# Patient Record
(demographics unavailable — no encounter records)

---

## 2025-04-18 NOTE — PHYSICAL EXAM
[Alert] : alert [Acute Distress] : no acute distress [Normocephalic] : normocephalic [Flat Open Anterior [place]] : flat open anterior fontanelle [PERRL] : PERRL [Red Reflex Bilateral] : red reflex bilateral [Normally Placed Ears] : normally placed ears [Auricles Well Formed] : auricles well formed [Clear Tympanic membranes] : clear tympanic membranes [Light reflex present] : light reflex present [Bony landmarks visible] : bony landmarks visible [Discharge] : no discharge [Nares Patent] : nares patent [Palate Intact] : palate intact [Uvula Midline] : uvula midline [Supple, full passive range of motion] : supple, full passive range of motion [Palpable Masses] : no palpable masses [Symmetric Chest Rise] : symmetric chest rise [Clear to Auscultation Bilaterally] : clear to auscultation bilaterally [Regular Rate and Rhythm] : regular rate and rhythm [S1, S2 present] : S1, S2 present [Murmurs] : no murmurs [+2 Femoral Pulses] : +2 femoral pulses [Soft] : soft [Tender] : nontender [Distended] : not distended [Bowel Sounds] : bowel sounds present [Hepatomegaly] : no hepatomegaly [Splenomegaly] : no splenomegaly [Normal external genitailia] : normal external genitalia [Central Urethral Opening] : central urethral opening [Testicles Descended Bilaterally] : testicles descended bilaterally [Normally Placed] : normally placed [No Abnormal Lymph Nodes Palpated] : no abnormal lymph nodes palpated [Zheng-Ortolani] : negative Zheng-Ortolani [Allis Sign] : negative Allis sign [Symmetric Flexed Extremities] : symmetric flexed extremities [Spinal Dimple] : no spinal dimple [Tuft of Hair] : no tuft of hair [Startle Reflex] : startle reflex present [Suck Reflex] : suck reflex present [Rooting] : rooting reflex present [Palmar Grasp] : palmar grasp reflex present [Plantar Grasp] : plantar grasp reflex present [Symmetric [place]] : symmetric [place] [Rash and/or lesion present] : no rash/lesion

## 2025-04-18 NOTE — PHYSICAL EXAM
[Alert] : alert [Acute Distress] : no acute distress [Normocephalic] : normocephalic [Flat Open Anterior [place]] : flat open anterior fontanelle [PERRL] : PERRL [Red Reflex Bilateral] : red reflex bilateral [Normally Placed Ears] : normally placed ears [Auricles Well Formed] : auricles well formed [Clear Tympanic membranes] : clear tympanic membranes [Light reflex present] : light reflex present [Bony landmarks visible] : bony landmarks visible [Discharge] : no discharge [Nares Patent] : nares patent [Palate Intact] : palate intact [Uvula Midline] : uvula midline [Supple, full passive range of motion] : supple, full passive range of motion [Palpable Masses] : no palpable masses [Symmetric Chest Rise] : symmetric chest rise [Clear to Auscultation Bilaterally] : clear to auscultation bilaterally [Regular Rate and Rhythm] : regular rate and rhythm [S1, S2 present] : S1, S2 present [Murmurs] : no murmurs [+2 Femoral Pulses] : +2 femoral pulses [Soft] : soft [Tender] : nontender [Distended] : not distended [Bowel Sounds] : bowel sounds present [Hepatomegaly] : no hepatomegaly [Splenomegaly] : no splenomegaly [Normal external genitailia] : normal external genitalia [Central Urethral Opening] : central urethral opening [Testicles Descended Bilaterally] : testicles descended bilaterally [Normally Placed] : normally placed [No Abnormal Lymph Nodes Palpated] : no abnormal lymph nodes palpated [Zheng-Ortolani] : negative Zheng-Ortolani [Allis Sign] : negative Allis sign [Symmetric Flexed Extremities] : symmetric flexed extremities [Spinal Dimple] : no spinal dimple [Tuft of Hair] : no tuft of hair [Startle Reflex] : startle reflex present [Suck Reflex] : suck reflex present [Rooting] : rooting reflex present [Palmar Grasp] : palmar grasp reflex present [Plantar Grasp] : plantar grasp reflex present [Symmetric Leonard] : symmetric [place] [Rash and/or lesion present] : no rash/lesion

## 2025-04-18 NOTE — DISCUSSION/SUMMARY
[Normal Growth] : growth [Normal Development] : development  [No Elimination Concerns] : elimination [Continue Regimen] : feeding [No Skin Concerns] : skin [Normal Sleep Pattern] : sleep [None] : no medical problems [Anticipatory Guidance Given] : Anticipatory guidance addressed as per the history of present illness section [Age Approp Vaccines] : Age appropriate vaccines administered [No Medications] : ~He/She~ is not on any medications [Parent/Guardian] : Parent/Guardian [FreeTextEntry1] : Feedings on demand, with a back up plan for scheduled feedings every 2-3 hours. Once weight gain is well established, it is generally then time to forgo the back up plan scheduled feedings. Clustered feedings, amount per feedings, and spacing of feedings will change frequently; follow the infant's lead.Anticipate 8-12 feedings per day.  Breast feeding benefits reviewed, as well as basic best practices.  Prenatal risk factors for hip dysplasia reviewed. Hospital records reviewed if available. Advance as directed  Vitamin D relevance and importance reviewed Follow up with any directions from the hospital or medical team including any prenatal discussions on matters that may require follow up. Some examples may include sonogram findings related to the kidneys, brain, or heart. [email or web address] as a resource over generic searches Cord and skin care Temperature definitions in infants, measuring temperature, presence or absence of concerning symptoms for temperature context, and appropriate timing of access to care reviewed.   2 month relevant matters revirewd as well.

## 2025-04-18 NOTE — DISCUSSION/SUMMARY
[Normal Growth] : growth [Normal Development] : development  [No Elimination Concerns] : elimination [Continue Regimen] : feeding [No Skin Concerns] : skin [Normal Sleep Pattern] : sleep [None] : no medical problems [Anticipatory Guidance Given] : Anticipatory guidance addressed as per the history of present illness section [Age Approp Vaccines] : Age appropriate vaccines administered [No Medications] : ~He/She~ is not on any medications [Parent/Guardian] : Parent/Guardian [FreeTextEntry1] : Feedings on demand, with a back up plan for scheduled feedings every 2-3 hours. Once weight gain is well established, it is generally then time to forgo the back up plan scheduled feedings. Clustered feedings, amount per feedings, and spacing of feedings will change frequently; follow the infant's lead.Anticipate 8-12 feedings per day.  Breast feeding benefits reviewed, as well as basic best practices.  Prenatal risk factors for hip dysplasia reviewed. Hospital records reviewed if available. Advance as directed  Vitamin D relevance and importance reviewed Follow up with any directions from the hospital or medical team including any prenatal discussions on matters that may require follow up. Some examples may include sonogram findings related to the kidneys, brain, or heart. [email or web address] as a resource over generic searches Cord and skin care Temperature definitions in infants, measuring temperature, presence or absence of concerning symptoms for temperature context, and appropriate timing of access to care reviewed.   2 month relevant matters revirewd as well.  normal shape

## 2025-04-18 NOTE — HISTORY OF PRESENT ILLNESS
[Parents] : parents [FreeTextEntry1] : Reviewed NICU Reviewed prenatal problems requiring potential follow up for feedings, jaundice, kidney/brain/heart/other sonograms, and risk factors for hip dysplasia.   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

## 2025-04-21 NOTE — HISTORY OF PRESENT ILLNESS
[FreeTextEntry6] : RANJITH FERNANDEZ is a 2 month old male presenting for follow up from the ER.  He was taken to the ED on 4/20/25 after calling 911 for choking while drinking his bottle. The ambulance took him to [facility] where he was diagnosed with a BRUE/reflux. He was monitored and discharged home. He has been drinking well since then.  Mom stopped fortifying the EHM to 22 montrell/ounce yesterday. He also had these episodes while in the NICU.  He was discharged home last week.   No recent illness. Mom also states that she has a concern about possible seizure activity. She said that he stiffens his extremities for a few seconds and sometimes looks like he is staring at something.  She said she brought these concerns up while he was in the NICU and she was told it was normal for his development/prematurity and some reflux.

## 2025-04-21 NOTE — PHYSICAL EXAM
[No Acute Distress] : no acute distress [NL] : soft, nontender, nondistended, normal bowel sounds, no hepatosplenomegaly [No Abnormal Lymph Nodes Palpated] : no abnormal lymph nodes palpated [Moves All Extremities x 4] : moves all extremities x4 [Normotonic] : normotonic [Warm] : warm

## 2025-04-21 NOTE — HISTORY OF PRESENT ILLNESS
[FreeTextEntry6] : RANJITH FERNANDEZ is a 2 month old male presenting for follow up from the ER.  He was taken to the ED on 4/20/25 after calling 911 for choking while drinking his bottle. The ambulance took him to [facility] where he was diagnosed with a BRUE/reflux. He was monitored and discharged home. He has been drinking well since then.  Mom stopped fortifying the EHM to 22 montrell/ounce yesterday. He also had these episodes while in the NICU.  He was discharged home last week.   No recent illness. Mom also states that she has a concern about possible seizure activity. She said that he stiffens his extremities for a few seconds and sometimes looks like he is staring at something.  She said she brought these concerns up while he was in the NICU and she was told it was normal for his development/prematurity and some reflux.

## 2025-04-21 NOTE — DISCUSSION/SUMMARY
[FreeTextEntry1] : 2 mo here in follow up after BRUE/Choking event.  Hospital records were reviewed today.   Journey is well appearing today.  We discussed reflux which can cause similar symptoms that can mimic seizure activity in infants.  Given the occurrence this weekend and his history, Pepcid was started.   Referral to ENT for swallow study given choking episodes.  Referral to pediatric neurology for parental concern regarding seizure like activity; I asked mom to record (if safe) episodes that she feels appear to be seizure like.    To reduce reflux symptoms, follow reflux precautions. Keep the baby upright after eating for 20 to 30 minutes after a feeding.  Keep baby away from cigarette smoke. Avoid overfeeding baby. Call office or seek medical attention for bloody stools or diarrhea, reduced diaper count, bloody emesis, prolonged crying which cannot be explained, refusal to drink, drowsiness or projectile emesis.

## 2025-04-23 NOTE — HISTORY OF PRESENT ILLNESS
[de-identified] : Angel is a 2 month old ex- 30 week boy here for evaluation after choking episode.  Here with parents who provide history.   Born premature and in the NICU after birth. Intubated for a few days and required NGT previously.  Recently discharged 2 weeks ago.  Had a choking episode 25 requiring evaluation at [facility] ED after lips turned blue.  Mom reports intermittently with choking but never turned blue like this at home before.  Appeared to be breath holding.  Does have noisy nasal sound after he eats.  No increased work of breathing.   Does have reflux with feeds.  Started on Pepcid 2 days ago. Stopped fortifying feeds after choking episode.   Passed  hearing test.  No hearing concerns.  No other otolaryngology concerns.

## 2025-04-23 NOTE — REASON FOR VISIT
[Initial Evaluation] : an initial evaluation for [Parents] : parents [FreeTextEntry2] : choking episode

## 2025-04-23 NOTE — ASSESSMENT
[FreeTextEntry1] : We reviewed laryngsocopy findings which demonstrate mild arytenoid and post cricoid edema, which can be seen with reflux. We discussed obtaining a swallow study - this was ordered today. Follow up 1-2 months, earlier as indicated.

## 2025-04-23 NOTE — PROCEDURE
[FreeTextEntry1] : Flexible fiberoptic laryngoscopy [FreeTextEntry2] : Choking episode  [FreeTextEntry3] :  After verbal consent was obtained, the flexible fiberoptic laryngoscope was passed and the following was seen:   Nasal passage: clear without lesions or drainage, choanae patent bilaterally Nasopharynx: Adenoids with 10 % obstruction, normal closure of the velopharyngeal sphincter Supraglottis: Normal shape and mucosalization of the epiglottis, normal appearing aryepiglottic folds and arytenoids with mild edema Glottis: Normal mobility of right and left vocal cords. No vocal cord lesions. Subglottis: Subglottis as able to visualize appears patent. Hypopharynx: Mild post-cricoid edema without pooling of secretions.   The patient tolerated the procedure well without complications.

## 2025-04-23 NOTE — BIRTH HISTORY
[At ___ Weeks Gestation] : at [unfilled] weeks gestation [Passed] : passed [de-identified] : Intubated for a few days, had feeding tube

## 2025-04-23 NOTE — CONSULT LETTER
[Dear  ___] : Dear  [unfilled], [Courtesy Letter:] : I had the pleasure of seeing your patient, [unfilled], in my office today. [Please see my note below.] : Please see my note below. [Sincerely,] : Sincerely, [FreeTextEntry3] : Tayler Cadrenas M.D. [place] [address] Tel [phone] Fax [phone]

## 2025-04-24 NOTE — PHYSICAL EXAM
[Well-appearing] : well-appearing [Normocephalic] : normocephalic [Anterior fontanel- Open] : anterior fontanel- open [Anterior fontanel- Soft] : anterior fontanel- soft [Anterior fontanel- Flat] : anterior fontanel- flat [No abnormal neurocutaneous stigmata or skin lesions] : no abnormal neurocutaneous stigmata or skin lesions [Straight] : straight [No kyle or dimples] : no kyle or dimples [No deformities] : no deformities [Alert] : alert [Pupils reactive to light] : pupils reactive to light [Turns to light] : turns to light [Tracks face, light or objects with full extraocular movements] : tracks face, light or objects with full extraocular movements [No facial asymmetry or weakness] : no facial asymmetry or weakness [No nystagmus] : no nystagmus [Normal axial and appendicular muscle tone with symmetric limb movements] : normal axial and appendicular muscle tone with symmetric limb movements [Normal bulk] : normal bulk [Good  bilaterally] : good  bilaterally [No abnormal involuntary movements] : no abnormal involuntary movements [2+ biceps] : 2+ biceps [Knee jerks] : knee jerks [Ankle jerks] : ankle jerks [Leonard] : [place] [Grasp] : grasp [Responds to touch and tickle] : responds to touch and tickle [de-identified] : HC: 35.5cm

## 2025-04-24 NOTE — ASSESSMENT
[FreeTextEntry1] : 2mo ex 30wk M w/ GERD presenting for evaluation of seizure like activity. Patient currently clinically stable w/ nonfocal exam. Etiology of current episodes unclear, suspect may be related to known GERD, however cannot r/o seizures at this point. Will evaluate w/ REEG/VEEG and can RTC in 6 weeks.

## 2025-04-24 NOTE — PHYSICAL EXAM
[Well-appearing] : well-appearing [Normocephalic] : normocephalic [Anterior fontanel- Open] : anterior fontanel- open [Anterior fontanel- Soft] : anterior fontanel- soft [Anterior fontanel- Flat] : anterior fontanel- flat [No abnormal neurocutaneous stigmata or skin lesions] : no abnormal neurocutaneous stigmata or skin lesions [Straight] : straight [No kyle or dimples] : no kyle or dimples [No deformities] : no deformities [Alert] : alert [Pupils reactive to light] : pupils reactive to light [Turns to light] : turns to light [Tracks face, light or objects with full extraocular movements] : tracks face, light or objects with full extraocular movements [No facial asymmetry or weakness] : no facial asymmetry or weakness [No nystagmus] : no nystagmus [Normal axial and appendicular muscle tone with symmetric limb movements] : normal axial and appendicular muscle tone with symmetric limb movements [Normal bulk] : normal bulk [Good  bilaterally] : good  bilaterally [No abnormal involuntary movements] : no abnormal involuntary movements [2+ biceps] : 2+ biceps [Knee jerks] : knee jerks [Ankle jerks] : ankle jerks [Leonard] : [place] [Grasp] : grasp [Responds to touch and tickle] : responds to touch and tickle [de-identified] : HC: 35.5cm

## 2025-04-24 NOTE — CONSULT LETTER
[Dear  ___] : Dear  [unfilled], [Consult Letter:] : I had the pleasure of evaluating your patient, [unfilled]. [Please see my note below.] : Please see my note below. [Consult Closing:] : Thank you very much for allowing me to participate in the care of this patient.  If you have any questions, please do not hesitate to contact me. [Sincerely,] : Sincerely, [FreeTextEntry3] : Ada Dickson MD PGY- 4 Child Neurology [facility]  Gonzalo Lua MD, FAAN, FAASM Director, Division of Pediatric Neurology Co-Director, Sleep Program for Children (Neurology) [facility]  Professor of Pediatrics & Neurology [employer] at [facility] and [facility] [address] [place] [address] (T) [phone]  (F) [phone]

## 2025-04-24 NOTE — HISTORY OF PRESENT ILLNESS
[FreeTextEntry1] : 2mo ex 30.2wk M w/ GERD presenting for initial evaluation of seizure like activity.   Mom has noted episodes of whole body shaking w/ associated fine tremor of bilateral hands and eyes widening since birth, currently happening approx. 5x per day lasting up to 15-20 seconds. Mom states they happen at any time of day, including sleep, and mom has not noiced any particular provoking factors (though they do often happen during feeds). Mom also states that sometimes episodes will include patient looking to one side (usually left) w/ associated head turning to the same side. Of note patient is an ex 30wk M w/ 2 month NICU stay for respiratory distress, feeding/growing. Episodes occured during NICU period as well. Mom notes that they have become less frequent over time. 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

## 2025-04-24 NOTE — REASON FOR VISIT
[Initial Consultation] : an initial consultation for [Other: ____] : [unfilled] [Parents] : parents [Mother] : mother

## 2025-04-24 NOTE — CONSULT LETTER
[Dear  ___] : Dear  [unfilled], [Consult Letter:] : I had the pleasure of evaluating your patient, [unfilled]. [Please see my note below.] : Please see my note below. [Consult Closing:] : Thank you very much for allowing me to participate in the care of this patient.  If you have any questions, please do not hesitate to contact me. [Sincerely,] : Sincerely, [FreeTextEntry3] : Ada Dickson MD PGY- 4 Child Neurology [facility]  Gonzalo Lua MD, FAAN, FAASM Director, Division of Pediatric Neurology Co-Director, Sleep Program for Children (Neurology) [facility]  Professor of Pediatrics & Neurology [facility] [employer] at [facility] and [facility] [address] [place] [address] (T) [phone]  (F) [phone]

## 2025-05-06 NOTE — ASSESSMENT
[FreeTextEntry1] : ASSESSMENT:  Mental Status: Received asleep with quiet alert state with unswaddling and repositioning.  POSTURAL CONTROL & MOVEMENT PATTERN: Head Control: [X] Age Appropriate Trunk Control: [X] Age Appropriate Involuntary movement (chorea, dystonia, fasciculation, myoclonus, spasms, tremor):[X] Not observed  ORAL MOTOR ASSESSMENT: Patient presents with facial symmetry and a predominantly closed mouth posture at rest. Palate was WFL, oral mucosa was moist, edentulous oral cavity- age appropriate. Age appropriate secretion management noted. Age appropriate oral reflexes: Patient demonstrated the following upon clinician elicitation including rooting reflex, lingual protrusion to lower lip, transverse tongue with tongue touching corners of mouth, phasic bite, and non nutritive suck Gag reflex: At this time clinician did not attempt to elicit a gag. Abnormal reflexes: Not demonstrated  CLINICAL SWALLOWING/FEEDING ASSESSMENT: The patient was assessed feeding semi upright cradled for bottle feeding. Parents present throughout evaluation. SLP and mother served as feeders. Respiratory Status During Evaluation: Room Air Secretion Management: Age appropriate There was [X]no coughing, [X] no throat clearing, and [X]no wet/gurgly vocal quality prior to PO administration.  CLINICAL SWALLOWING EVALUATION TRIALS: Consistencies Administered/Modality of administration/utensil: [X] Expressed human breast milk (EHBM) via Parents Choice Slow Flow nipple and Dr. Orona's /Transition nipple  Endurance during meal/trials: [X] Good, quiet alert state throughout Patient required [X] minimal cues to complete testing/evaluation.  Oral phases: Patient's oral phases were marked by Orientation to feeding task: [X] Immediate mouth opening, initiation of latch and nutritive sucking Labial movements: [X] Functional latch to nipple. Moderate anterior loss via Parent's Choice nipple (home bottle system), Age appropriate oral containment noted with Dr. Orona's /Transition nipple  Lingual movements: Good lingual cupping, Good fluid expression with 1-2 sucks per swallow Coordination: Coordinated suck, swallow, breathe pattern with self pacing via Dr. Orona's Josephine/Transition nipple, Rapid uncoordinated sucking bursts via home bottle system  Endurance: Age appropriate Jaw movement: [X] Rhythmic Transport: Rapid AP transfer and oral transit with Parent's  Choice Slow flow (home bottle system) with Timely oral transit via Dr. Acharyas Josephine/Transition nipple and Adequate AP transfer Clearance: Age appropriate clearance from oral cavity  Pharyngeal Phase: Assessed via digital palpation Pharyngeal stage was marked by Initiation of the pharyngeal swallow: Prompt Hyolaryngeal elevation: Adequate Overt s/s of penetration/aspiration: Audible swallows noted via Parent's Choice Slow flow, remediated with use of Dr. Orona's Josephine/Transition nipple  Cardiopulmonary changes: Not demonstrated  PROGNOSIS: Prognosis is good for safe and adequate oral intake of the recommended consistencies as outlined below, based on the results of today's assessment and the medical history reported.  EDUCATION: Discussed results of evaluation with Mother who expressed understanding of evaluation and agreement with goals and treatment plan. Provided written preliminary recommendations.  IMPRESSIONS: Patient, a 2 month old former 30 week male, was seen for a Clinical Swallow Evaluation upon referral from his Otolaryngologist given history of coughing and choking with oral feedings. During oral motor examination, patient demonstrated age appropriate oral reflexes, and appropriate range of motion, strength, and coordination of lingual, labial, mandibular and buccal movements for oral feeding. Patient presents with immature feeding pattern with emerging coordination of  suck, swallow, breathe pattern. Patient benefitted from feeding strategies and supports to facilitate improved performance such as decreased flow rate and external pacing during initial sucking bursts. Audible swallows noted for trials via home bottle system due to coordination difficulties. With therapeutic supports in place, No overt signs/symptoms of penetration/aspiration or cardiopulmonary changes demonstrated for EHBM via Dr. Sahni /Transition nipple. Recommend to initiate oral feedings via Dr. Sahni Josephine/Transition nipple and anticipate continued improvements  therapeutic intervention and maturation.  Diet/Fluid Recommendations: Initiate oral diet of EHBM via .gisela   Feeding/Swallowing Recommendations: Provide external pacing as needed Upright position for 30 minutes after feeding Monitor for engagement/disengagement cues  ADDITIONAL RECOMMENDATIONS: 1. Initiate short-term outpatient dysphagia therapy addressing aforementioned deficits. Plan to defer further instrumental testing at this time given results of assessment this date.  2. It is recommended that patient participate in feeding therapy through Early Intervention addressing above mentioned deficits and age appropriate feeding skills.  3. Follow up with Gastroenterology as scheduled given history of reflux  4. Follow up with Otolaryngologist as scheduled  TREATMENT PLAN FOR SHORT-TERM OUTPATIENT DYSPHAGIA THERAPY AT THE [facility] & [facility] [place] TO INCLUDE:  LONG TERM GOALS: Patient will safely tolerate full oral feedings SHORT TERM GOALS: 1. Patient will safely tolerate oral diet of EHBM via Dr. Orona's Josephine/Transition nipple  without overt signs/ symptoms of penetration/aspiration in 100% of opportunities 2. Patient will demonstrate improved coordination of suck, swallow, breathe ratios to 1 suck, 1 swallow, 1 breath    3. Patient will demonstrate age appropriate endurance for oral feedings consuming full feeding in 30 minutes or less  4. Patient will complete oral stimulation program to support improved coordination of oromotor movements and respiration  5. Parent will demonstrate understanding of safe feeding guidelines to support safe oral feeding experiences   Aspiration precautions: [X] Monitor for signs and symptoms of aspiration and or laryngeal penetration, such as change of breathing pattern, cough, throat clearing, gurgly/wet voice, color change, fever, pneumonia, and upper respiratory infection. If noted: Discontinue oral intake, provide non-oral nutrition/hydration/meds, and contact physician immediately.  This referral process was reviewed with the parent. No further recommendations were made at this time. Please feel free to contact the Center at [phone], if any additional information is needed.   Cici Feliciano M.S., CCC-SLP [place] License# [id number]  References THANIA Clarke., & ADAM Walker (2001). Pediatric Swallowing and Feeding Assessment and Management (2nd ed.). JESSE Freeman, & AYLA Looney (2000). Pre-feeding skills: A comprehensive resource for mealtime development. Therapy [employer].  Aspiration precautions: [X] Monitor for signs and symptoms of aspiration and or laryngeal penetration, such as change of breathing pattern, cough, throat clearing, gurgly/wet voice, color change, fever, pneumonia, and upper respiratory infection. If noted: Discontinue oral intake, provide non-oral nutrition/hydration/meds, and contact physician immediately.  This referral process was reviewed with the parent. No further recommendations were made at this time. Please feel free to contact the Center at [phone], if any additional information is needed.  Cici Feliciano M.S., CCC-SLP [place] License# [id number]  References JAMIE Clarke, & ADAM Walker (2001). Pediatric Swallowing and Feeding Assessment and Management (2nd ed.). [employer].  JESSE Garay, & AYLA Looney (2000). Pre-feeding skills: A comprehensive resource for mealtime development. Therapy Sk[employer].

## 2025-05-06 NOTE — ASSESSMENT
[FreeTextEntry1] : ASSESSMENT:  Mental Status: Received asleep with quiet alert state with unswaddling and repositioning.  POSTURAL CONTROL & MOVEMENT PATTERN: Head Control: [X] Age Appropriate Trunk Control: [X] Age Appropriate Involuntary movement (chorea, dystonia, fasciculation, myoclonus, spasms, tremor):[X] Not observed  ORAL MOTOR ASSESSMENT: Patient presents with facial symmetry and a predominantly closed mouth posture at rest. Palate was WFL, oral mucosa was moist, edentulous oral cavity- age appropriate. Age appropriate secretion management noted. Age appropriate oral reflexes: Patient demonstrated the following upon clinician elicitation including rooting reflex, lingual protrusion to lower lip, transverse tongue with tongue touching corners of mouth, phasic bite, and non nutritive suck Gag reflex: At this time clinician did not attempt to elicit a gag. Abnormal reflexes: Not demonstrated  CLINICAL SWALLOWING/FEEDING ASSESSMENT: The patient was assessed feeding semi upright cradled for bottle feeding. Parents present throughout evaluation. SLP and mother served as feeders. Respiratory Status During Evaluation: Room Air Secretion Management: Age appropriate There was [X]no coughing, [X] no throat clearing, and [X]no wet/gurgly vocal quality prior to PO administration.  CLINICAL SWALLOWING EVALUATION TRIALS: Consistencies Administered/Modality of administration/utensil: [X] Expressed human breast milk (EHBM) via Parents Choice Slow Flow nipple and Dr. Orona's /Transition nipple  Endurance during meal/trials: [X] Good, quiet alert state throughout Patient required [X] minimal cues to complete testing/evaluation.  Oral phases: Patient's oral phases were marked by Orientation to feeding task: [X] Immediate mouth opening, initiation of latch and nutritive sucking Labial movements: [X] Functional latch to nipple. Moderate anterior loss via Parent's Choice nipple (home bottle system), Age appropriate oral containment noted with Dr. Orona's /Transition nipple  Lingual movements: Good lingual cupping, Good fluid expression with 1-2 sucks per swallow Coordination: Coordinated suck, swallow, breathe pattern with self pacing via Dr. Orona's Marion/Transition nipple, Rapid uncoordinated sucking bursts via home bottle system  Endurance: Age appropriate Jaw movement: [X] Rhythmic Transport: Rapid AP transfer and oral transit with Parent's  Choice Slow flow (home bottle system) with Timely oral transit via Dr. Acharyas Marion/Transition nipple and Adequate AP transfer Clearance: Age appropriate clearance from oral cavity  Pharyngeal Phase: Assessed via digital palpation Pharyngeal stage was marked by Initiation of the pharyngeal swallow: Prompt Hyolaryngeal elevation: Adequate Overt s/s of penetration/aspiration: Audible swallows noted via Parent's Choice Slow flow, remediated with use of Dr. Orona's Marion/Transition nipple  Cardiopulmonary changes: Not demonstrated  PROGNOSIS: Prognosis is good for safe and adequate oral intake of the recommended consistencies as outlined below, based on the results of today's assessment and the medical history reported.  EDUCATION: Discussed results of evaluation with Mother who expressed understanding of evaluation and agreement with goals and treatment plan. Provided written preliminary recommendations.  IMPRESSIONS: Patient, a 2 month old former 30 week male, was seen for a Clinical Swallow Evaluation upon referral from his Otolaryngologist given history of coughing and choking with oral feedings. During oral motor examination, patient demonstrated age appropriate oral reflexes, and appropriate range of motion, strength, and coordination of lingual, labial, mandibular and buccal movements for oral feeding. Patient presents with immature feeding pattern with emerging coordination of  suck, swallow, breathe pattern. Patient benefitted from feeding strategies and supports to facilitate improved performance such as decreased flow rate and external pacing during initial sucking bursts. Audible swallows noted for trials via home bottle system due to coordination difficulties. With therapeutic supports in place, No overt signs/symptoms of penetration/aspiration or cardiopulmonary changes demonstrated for EHBM via Dr. Sahni /Transition nipple. Recommend to initiate oral feedings via Dr. Sahni Marion/Transition nipple and anticipate continued improvements  therapeutic intervention and maturation.  Diet/Fluid Recommendations: Initiate oral diet of EHBM via .gisela   Feeding/Swallowing Recommendations: Provide external pacing as needed Upright position for 30 minutes after feeding Monitor for engagement/disengagement cues  ADDITIONAL RECOMMENDATIONS: 1. Initiate short-term outpatient dysphagia therapy addressing aforementioned deficits. Plan to defer further instrumental testing at this time given results of assessment this date.  2. It is recommended that patient participate in feeding therapy through Early Intervention addressing above mentioned deficits and age appropriate feeding skills.  3. Follow up with Gastroenterology as scheduled given history of reflux  4. Follow up with Otolaryngologist as scheduled  TREATMENT PLAN FOR SHORT-TERM OUTPATIENT DYSPHAGIA THERAPY AT THE [facility] & SPEE[facility] [place] TO INCLUDE:  LONG TERM GOALS: Patient will safely tolerate full oral feedings SHORT TERM GOALS: 1. Patient will safely tolerate oral diet of EHBM via Dr. Orona's Marion/Transition nipple  without overt signs/ symptoms of penetration/aspiration in 100% of opportunities 2. Patient will demonstrate improved coordination of suck, swallow, breathe ratios to 1 suck, 1 swallow, 1 breath    3. Patient will demonstrate age appropriate endurance for oral feedings consuming full feeding in 30 minutes or less  4. Patient will complete oral stimulation program to support improved coordination of oromotor movements and respiration  5. Parent will demonstrate understanding of safe feeding guidelines to support safe oral feeding experiences   Aspiration precautions: [X] Monitor for signs and symptoms of aspiration and or laryngeal penetration, such as change of breathing pattern, cough, throat clearing, gurgly/wet voice, color change, fever, pneumonia, and upper respiratory infection. If noted: Discontinue oral intake, provide non-oral nutrition/hydration/meds, and contact physician immediately.  This referral process was reviewed with the parent. No further recommendations were made at this time. Please feel free to contact the Center at [phone], if any additional information is needed.   Cici Feliciano M.S., CCC-SLP [place] License# [id number]  References THANIA Clarke., & ADAM Walker (2001). Pediatric Swallowing and Feeding Assessment and Management (2nd ed.). JESSE Freeman, & AYLA Looney (2000). Pre-feeding skills: A comprehensive resource for mealtime development. Therapy [employer].  Aspiration precautions: [X] Monitor for signs and symptoms of aspiration and or laryngeal penetration, such as change of breathing pattern, cough, throat clearing, gurgly/wet voice, color change, fever, pneumonia, and upper respiratory infection. If noted: Discontinue oral intake, provide non-oral nutrition/hydration/meds, and contact physician immediately.  This referral process was reviewed with the parent. No further recommendations were made at this time. Please feel free to contact the Center at [phone], if any additional information is needed.  Cici Feliciano M.S., CCC-SLP [place] License# [id number]  References JAMIE Clarke, & ADAM Walker (2001). Pediatric Swallowing and Feeding Assessment and Management (2nd ed.). [employer].  JESSE Garay, & AYLA Looney (2000). Pre-feeding skills: A comprehensive resource for mealtime development. Therapy Skill Builders.

## 2025-05-06 NOTE — HISTORY OF PRESENT ILLNESS
[FreeTextEntry1] : BIRTH & MEDICAL HISTORY:  Patient is a 2 month old former 30 week male born via . Maternal history notable for bicornuate uterus. Prenatal history notable for oligohydramnios, PPROM, PTL. Parent initially presented to [facility] for concerns for  labor, received BMZ x 1, transferred to [facility]. NICU course significant for Prematurity 30 wks, with CLD, apnea of prematurity, positional talipes. Medical history is significant for reflux and choking episode 2025 with evaluation at [facility] ED after lips turned blue. Seen by Otolaryngology 2025, with findings of  Normal shape and mucosalization of the epiglottis, normal appearing aryepiglottic folds and arytenoids with mild edema, Normal mobility of right and left vocal cords. No vocal cord lesions. Subglottis as able to visualize appears patent. Hypopharynx: Mild post-cricoid edema without pooling of secretions."  Current Respiratory Status: Room air History of Intubation: ~3 days in NICU Medications: Pepcid, Multivitamins, Iron Medical allergies: None reported  Food Allergies: None reported  Food intolerances: None reported    Reported therapeutic history: None reported    Results of Previous Modified Barium Swallow Study (MBSS)/Videofluoroscopic Swallow Studies (VFSS): None reported    Results of Previous Clinical swallow evaluations: Seen for evaluation in [facility] NICU. Please see Allscripts chart   FEEDING HISTORY: Current Diet (based on the International Dysphagia Diet Standardization initiative [IDDSI]):  Current nutritional intake: Exclusive oral diet of EHBM via Parents Choice Slow Flow nipple, Per mother, had been on fortified EHBM, mother stated she discontinued fortification following choking episode 2 weeks ago   Feeding utensils: Parents Choice Slow Flow nipple Feeding schedule: 80-120ccs every 2-4 hours Length of time taken to complete a feeding: 10-15 minutes Feeding Method:  Dependent in self-feeding- age appropriate  Reported Endurance During Meals:  Good  History of Feeding Tube: NGT during NICU stay, discharged home on full oral feedings

## 2025-05-06 NOTE — HISTORY OF PRESENT ILLNESS
[FreeTextEntry1] : BIRTH & MEDICAL HISTORY:  Patient is a 2 month old former 30 week male born via . Maternal history notable for bicornuate uterus. Prenatal history notable for oligohydramnios, PPROM, PTL. Parent initially presented to [facility] for concerns for  labor, received BMZ x 1, transferred to [facility]. NICU course significant for Prematurity 30 wks, with CLD, apnea of prematurity, positional talipes. Medical history is significant for reflux and choking episode 2025 with evaluation at [facility] ED after lips turned blue. Seen by Otolaryngology 2025, with findings of  Normal shape and mucosalization of the epiglottis, normal appearing aryepiglottic folds and arytenoids with mild edema, Normal mobility of right and left vocal cords. No vocal cord lesions. Subglottis as able to visualize appears patent. Hypopharynx: Mild post-cricoid edema without pooling of secretions."  Current Respiratory Status: Room air History of Intubation: ~3 days in NICU Medications: Pepcid, Multivitamins, Iron Medical allergies: None reported  Food Allergies: None reported  Food intolerances: None reported    Reported therapeutic history: None reported    Results of Previous Modified Barium Swallow Study (MBSS)/Videofluoroscopic Swallow Studies (VFSS): None reported    Results of Previous Clinical swallow evaluations: Seen for evaluation in [facility] NICU. Please see Allscripts chart   FEEDING HISTORY: Current Diet (based on the International Dysphagia Diet Standardization initiative [IDDSI]):  Current nutritional intake: Exclusive oral diet of EHBM via Parents Choice Slow Flow nipple, Per mother, had been on fortified EHBM, mother stated she discontinued fortification following choking episode 2 weeks ago   Feeding utensils: Parents Choice Slow Flow nipple Feeding schedule: 80-120ccs every 2-4 hours Length of time taken to complete a feeding: 10-15 minutes Feeding Method:  Dependent in self-feeding- age appropriate  Reported Endurance During Meals:  Good  History of Feeding Tube: NGT during NICU stay, discharged home on full oral feedings

## 2025-05-06 NOTE — REASON FOR VISIT
[Initial] : initial visit for [Clinical Swallow] : clinical swallow evaluation [Parents] : parents [Medical Records] : medical records [FreeTextEntry1] : Dr. Katie Dailey, Otolaryngologist, to clinically assess oropharyngeal swallow function given report of coughing and choking with oral feedings.

## 2025-05-07 NOTE — DISCUSSION/SUMMARY
[GA at Birth: ___] : GA at Birth: [unfilled] [Chronological Age: ___] : Chronological Age: [unfilled] [Corrected Age: ___] : Corrected Age: [unfilled] [Alert] : alert [Irritable] : irritable [Vocalizes] : vocalizes [Asymmetrical Tonic Neck Reflex (1-3 months)] : asymmetrical tonic neck reflex (1-3 months) [Turns head to both sides (0-2 months)] : turns head to both sides (0-2 months) [Moves extremities equally] : moves extremities equally [Moves against gravity] : moves against gravity [Hands to midline (0-3 months)] : hands to midline (0-3 months) [Turns head side to side] : turns head side to side [Lifts head (45 deg 0-2 mon, 90 deg 1-3 mon)] : lifts head (45 degrees 0-2 months, 90 degrees 1-3 months) [Passive] : prone to supine (2- 5 months) - Passive [Lag] : Head lag (0-2 months) - lag [Poor] : head control is poor [Gross Grasp] : gross grasp [Release] : release [<] : < [Good] : good [] : with in normal limits [Fusses] : fusses [Supine] : supine [Prone] : prone [Sidelying] : sidelying [FreeTextEntry1] : developmental delay, 30 weeker [FreeTextEntry2] : sezidoni activity [FreeTextEntry6] : (+) slip through [FreeTextEntry3] : Infant seen for PT evaluation. Mother present. Infant noted to be fussy during tummy time, minimal head lifting, noted some jerky/jittery movements of extremities, fisted hands-Dr. Cowan aware. PT educated mother on developmental positioning packet 1 with good understanding. PT demonstrated safe tummy time positioning, sidelying positioning, & finger extension stretching. Mother with good understanding, all questions addressed. Dr. Cowan made aware.

## 2025-05-07 NOTE — REASON FOR VISIT
[Follow-Up] : a follow-up visit for [Weight Check] : weight check [Developmental Delay] : developmental delay [Medical Records] : medical records [Mother] : mother

## 2025-05-10 NOTE — CONSULT LETTER
[Dear  ___] : Dear  [unfilled], [Courtesy Letter:] : I had the pleasure of seeing your patient, [unfilled], in my office today. [Sincerely,] : Sincerely, [FreeTextEntry3] : Eva Colin MD Attending Neonatologist [facility]

## 2025-05-10 NOTE — DATA REVIEWED
[de-identified] : eeg negative; neuro requested in patient veeg per mothers report; will follow up with neuro in [place]

## 2025-05-10 NOTE — PATIENT INSTRUCTIONS
[Verbal patient instructions provided] : Verbal patient instructions provided. [FreeTextEntry1] : current weight: 8lbs 5oz length: 1 foot 9 inches head circumference 14inches follow up with neurology 6/6/25 ophthalmologist 6 months neurodevelopmental; follow up at 6 months [FreeTextEntry2] : tummy time education reinforced as well as exercises given by PT to do at home [FreeTextEntry3] : follow up new referral made [FreeTextEntry4] : continue breastmilk 4ounces every 3-4 hours [FreeTextEntry5] : continue ferrinsol, poly-vi-sol, and famotidine  [FreeTextEntry6] : n/a [FreeTextEntry7] : n/a [FreeTextEntry8] : follow schedule with pmd [de-identified] : aquaphor for dry skin [de-identified] : follow up with neurology 6/6/25 s/p eeg [de-identified] : video eeg per neurology to be done in hospital

## 2025-05-10 NOTE — HISTORY OF PRESENT ILLNESS
[Gestational Age: ___] : Gestational Age: [unfilled] [Chronological Age: ___] : Chronological Age: [unfilled] [Date of D/C: ___] : Date of D/C: [unfilled] [Developmental Pediatrics: ___] : Developmental Pediatrics: [unfilled] [Ophthalmology: ___] : Ophthalmology: [unfilled] [Corrected Age: ___] : Corrected Age: [unfilled] [Weight Gain Since Last Visit (oz/days) ___] : weight gain since last visit: [unfilled] (oz/days)  [Bloody] : not bloody [Mucousy] : no mucous [de-identified] : d/c [facility] [de-identified] : need for developmental and high risk follow up [de-identified] : 4/20/25 BRUE: choking episode on easter with feeding, brought to er at [facility] [de-identified] : neuro; s/p EEG follow up 6/6/25 [de-identified] : done [de-identified] : RUY [FreeTextEntry3] : attempted; does not latch well [de-identified] : seedy [de-identified] : supine in [facility] [de-identified] : n/a [de-identified] : n/a [de-identified] : n/a

## 2025-05-10 NOTE — BIRTH HISTORY
[Birthweight ___ kg] : weight [unfilled] kg [Weight ___ kg] : weight [unfilled] kg [Length ___ cm] : length [unfilled] cm [Head Circumference ___ cm] : head circumference [unfilled] cm [EHM: ___] : EHM: [unfilled] [de-identified] : Neonatologist was called by OB/NICU for precipitous delivery, prematurity. Baby is a 30+2 wk GA male born to a 20yo ->1 parent via . Maternal history notable for bicornuate uterus. Prenatal history notable for oligohydramnios, PPROM, PTL. Parent initially presented to [facility] for concerns for  labor, received BMZ x 1, transferred to [facility]. Prenatal labs: A neg, HIV neg, HepB nonreactive, Hep-C nonreactive, RPR nonreactive, Rubella immune, GBS unknown. SROM time unknown, clear fluids. Baby born vigorous with spontaneous cry, delayed cord clamping x45s, then baby delivered to warmer bed/thermal mattress and WDSS. Started on CPAP for prematurity, intermittent respiratory effort noted and received brief PPV 20/5, then weaned back to CPAP with improved respiratory effort, max CPAP support CPAP6 80% to maintain sats in minute-of-life goals. Transferred to NICU on bCPAP6 70%. Parent updated after delivery. Apgar 8 at 1 minute and 8 at 5 minutes of life. Exam notable for positional talipes, micrognathia, midline nasal divot, splayed sutures. COURSE: Prematurity 30 wks, with CLD, apnea of prematurity, , positional talipes, Anemia of Prematurity Resolved issues: pos surface PCR for staph, immature feeding pattern, Resolved issues: RDS, Presumed sepsis, hyperbilirubinemia requiring photo Rx, Direct Hyperbili,   [de-identified] : 30.2 weeks, positional talipes, splayed sutures, neurology following for eval of seizure activity with normal veeg, PFO (bidirectional)

## 2025-05-10 NOTE — REVIEW OF SYSTEMS
[Immunizations are up to date] : Immunizations are up to date [Nl] : Allergy/Immunology [de-identified] : left foot positional talpes, splayed sutures

## 2025-05-10 NOTE — HISTORY OF PRESENT ILLNESS
[Gestational Age: ___] : Gestational Age: [unfilled] [Chronological Age: ___] : Chronological Age: [unfilled] [Date of D/C: ___] : Date of D/C: [unfilled] [Developmental Pediatrics: ___] : Developmental Pediatrics: [unfilled] [Ophthalmology: ___] : Ophthalmology: [unfilled] [Corrected Age: ___] : Corrected Age: [unfilled] [Weight Gain Since Last Visit (oz/days) ___] : weight gain since last visit: [unfilled] (oz/days)  [Bloody] : not bloody [Mucousy] : no mucous [de-identified] : d/c [facility] [de-identified] : need for developmental and high risk follow up [de-identified] : 4/20/25 BRUE: choking episode on easter with feeding, brought to er at [facility] [de-identified] : neuro; s/p EEG follow up 6/6/25 [de-identified] : done [de-identified] : RUY [FreeTextEntry3] : attempted; does not latch well [de-identified] : seedy [de-identified] : supine in [facility] [de-identified] : n/a [de-identified] : n/a [de-identified] : n/a

## 2025-05-10 NOTE — BIRTH HISTORY
[Birthweight ___ kg] : weight [unfilled] kg [Weight ___ kg] : weight [unfilled] kg [Length ___ cm] : length [unfilled] cm [Head Circumference ___ cm] : head circumference [unfilled] cm [EHM: ___] : EHM: [unfilled] [de-identified] : Neonatologist was called by OB/NICU for precipitous delivery, prematurity. Baby is a 30+2 wk GA male born to a 20yo ->1 parent via . Maternal history notable for bicornuate uterus. Prenatal history notable for oligohydramnios, PPROM, PTL. Parent initially presented to [facility] for concerns for  labor, received BMZ x 1, transferred to [facility]. Prenatal labs: A neg, HIV neg, HepB nonreactive, Hep-C nonreactive, RPR nonreactive, Rubella immune, GBS unknown. SROM time unknown, clear fluids. Baby born vigorous with spontaneous cry, delayed cord clamping x45s, then baby delivered to warmer bed/thermal mattress and WDSS. Started on CPAP for prematurity, intermittent respiratory effort noted and received brief PPV 20/5, then weaned back to CPAP with improved respiratory effort, max CPAP support CPAP6 80% to maintain sats in minute-of-life goals. Transferred to NICU on bCPAP6 70%. Parent updated after delivery. Apgar 8 at 1 minute and 8 at 5 minutes of life. Exam notable for positional talipes, micrognathia, midline nasal divot, splayed sutures. COURSE: Prematurity 30 wks, with CLD, apnea of prematurity, , positional talipes, Anemia of Prematurity Resolved issues: pos surface PCR for staph, immature feeding pattern, Resolved issues: RDS, Presumed sepsis, hyperbilirubinemia requiring photo Rx, Direct Hyperbili,   [de-identified] : 30.2 weeks, positional talipes, splayed sutures, neurology following for eval of seizure activity with normal veeg, PFO (bidirectional)

## 2025-05-10 NOTE — PATIENT INSTRUCTIONS
[Verbal patient instructions provided] : Verbal patient instructions provided. [FreeTextEntry1] : current weight: 8lbs 5oz length: 1 foot 9 inches head circumference 14inches follow up with neurology 6/6/25 ophthalmologist 6 months neurodevelopmental; follow up at 6 months [FreeTextEntry2] : tummy time education reinforced as well as exercises given by PT to do at home [FreeTextEntry3] : follow up new referral made [FreeTextEntry4] : continue breastmilk 4ounces every 3-4 hours [FreeTextEntry5] : continue ferrinsol, poly-vi-sol, and famotidine  [FreeTextEntry6] : n/a [FreeTextEntry7] : n/a [FreeTextEntry8] : follow schedule with pmd [de-identified] : aquaphor for dry skin [de-identified] : follow up with neurology 6/6/25 s/p eeg [de-identified] : video eeg per neurology to be done in hospital

## 2025-05-10 NOTE — REVIEW OF SYSTEMS
[Immunizations are up to date] : Immunizations are up to date [Nl] : Allergy/Immunology [de-identified] : left foot positional talpes, splayed sutures

## 2025-05-10 NOTE — BIRTH HISTORY
[Birthweight ___ kg] : weight [unfilled] kg [Weight ___ kg] : weight [unfilled] kg [Length ___ cm] : length [unfilled] cm [Head Circumference ___ cm] : head circumference [unfilled] cm [EHM: ___] : EHM: [unfilled] [de-identified] : Neonatologist was called by OB/NICU for precipitous delivery, prematurity. Baby is a 30+2 wk GA male born to a 20yo ->1 parent via . Maternal history notable for bicornuate uterus. Prenatal history notable for oligohydramnios, PPROM, PTL. Parent initially presented to [facility] for concerns for  labor, received BMZ x 1, transferred to [facility]. Prenatal labs: A neg, HIV neg, HepB nonreactive, Hep-C nonreactive, RPR nonreactive, Rubella immune, GBS unknown. SROM time unknown, clear fluids. Baby born vigorous with spontaneous cry, delayed cord clamping x45s, then baby delivered to warmer bed/thermal mattress and WDSS. Started on CPAP for prematurity, intermittent respiratory effort noted and received brief PPV 20/5, then weaned back to CPAP with improved respiratory effort, max CPAP support CPAP6 80% to maintain sats in minute-of-life goals. Transferred to NICU on bCPAP6 70%. Parent updated after delivery. Apgar 8 at 1 minute and 8 at 5 minutes of life. Exam notable for positional talipes, micrognathia, midline nasal divot, splayed sutures. COURSE: Prematurity 30 wks, with CLD, apnea of prematurity, , positional talipes, Anemia of Prematurity Resolved issues: pos surface PCR for staph, immature feeding pattern, Resolved issues: RDS, Presumed sepsis, hyperbilirubinemia requiring photo Rx, Direct Hyperbili,   [de-identified] : 30.2 weeks, positional talipes, splayed sutures, neurology following for eval of seizure activity with normal veeg, PFO (bidirectional)

## 2025-05-10 NOTE — CONSULT LETTER
[Dear  ___] : Dear  [unfilled], [Courtesy Letter:] : I had the pleasure of seeing your patient, [unfilled], in my office today. [Sincerely,] : Sincerely, [FreeTextEntry3] : Eva Colin MD Attending Neonatologist [facility]

## 2025-05-10 NOTE — REVIEW OF SYSTEMS
[Immunizations are up to date] : Immunizations are up to date [Nl] : Allergy/Immunology [de-identified] : left foot positional talpes, splayed sutures

## 2025-05-10 NOTE — CONSULT LETTER
[Dear  ___] : Dear  [unfilled], [Courtesy Letter:] : I had the pleasure of seeing your patient, [unfilled], in my office today. [Sincerely,] : Sincerely, [FreeTextEntry3] : Eva Colin MD Attending Neonatologist [facility]

## 2025-05-10 NOTE — PHYSICAL EXAM
[Pink] : pink [Well Perfused] : well perfused [No Rashes] : no rashes [No Birth Marks] : no birth marks [Conjunctiva Clear] : conjunctiva clear [PERRL] : pupils were equal, round, reactive to light  [Ears Normal Position and Shape] : normal position and shape of ears [Nares Patent] : nares patent [No Nasal Flaring] : no nasal flaring [Moist and Pink Mucous Membranes] : moist and pink mucous membranes [Palate Intact] : palate intact [No Torticollis] : no torticollis [No Neck Masses] : no neck masses [Symmetric Expansion] : symmetric chest expansion [No Retractions] : no retractions [Clear to Auscultation] : lungs clear to auscultation  [Normal S1, S2] : normal S1 and S2 [Regular Rhythm] : regular rhythm [No Murmur] : no mumur [Normal Pulses] : normal pulses [Non Distended] : non distended [No HSM] : no hepatosplenomegaly appreciated [No Masses] : no masses were palpated [Normal Bowel Sounds] : normal bowel sounds [No Umbilical Hernia] : no umbilical hernia [Normal Genitalia] : normal genitalia [No Sacral Dimples] : no sacral dimples [No Scoliosis] : no scoliosis [Normal Range of Motion] : normal range of motion [Normal Posture] : normal posture [No evidence of Hip Dislocation] : no evidence of hip dislocation [Active and Alert] : active and alert [Normal muscle tone] : normal muscle tone of all extremites [Normal truncal tone] : normal truncal tone [Normal deep tendon reflexes] : normal deep tendon reflexes [No head lag] : no head lag [Symmetric Leonard] : the [place] reflex was ~L present [Palmar Grasp] : the palmar grasp reflex was ~L present [Plantar Grasp] : the plantar grasp reflex was ~L present [Strong Suck] : the strong sucking reflex was ~L present [Rooting] : the rooting reflex was ~L present [Placing/Stepping] : the placing/stepping reflex was present [Fixes On Faces] : fixes on faces [Turns Head Side to Side in Prone] : turns head side to side in prone [Follows to Midline] : the gaze does not follow to the midline [Follows Past Midline] : the gaze does not follow past the midline [Follows 180 Degrees] : visual track 180 degrees not achieved [Smiles Sociallly] : does not have a social smile [Laughs] : does not laugh [[place]] : does not  [Babbles] : does not babble [Lifts Head And Chest 30 degress in Prone] : does not lift the head and chest 30 degress in prone [Lifts Head And Chest 45 degress in Prone] : does not lift the head and chest 45 degress in prone [Weight Shifts in Prone] : does not weight shift in prone [Reaches For Objects in Prone] : does not reach for objects in prone [Rolls Front to Back] : does not roll front to back [Separates Hip Girdle From Trunk in Rolling] : does not separate hip girdle from trunk in rolling [Rolls Back to Front] : does not roll over from back to front [Brings Feet to Mouth] : does not bring feet to mouth [Gets to Quadruped] : does not get to quadruped [Maintains Quadruped] : does not maintain quadruped [Crawls] : does not crawl [Creeps] : does not creeps [Sits With Support] : does not sit with support [Tripod Sits with Support] : tripod sits without support [Sits With Support with Back Straight] : does not sit with support back straight [Gets to Knees] : does not get to knees [Pulls Stand] : does not pull self to a standing position [Cruises] : does not walk holding onto furniture [Hands Open] : the hands are not open [Reaches for Objects] : does not reach for objects [Transfers Objects] : does not transfer objects from hand to hand [Rakes Small Objects] : does not rake small objects [Mature Pincer Grasp] : does not have a mature pincer grasp [Swats at Objects] : does not swat at objects [Brings Hands to Mouth] : does not bring hands to mouth [Brings Hands to Midline] : does not bring hands to midline [Brings Objects to Mouth] : does not bring objects to mouth [de-identified] : left foot positional talipes [de-identified] : splayed sutures, +slip through, low tone in upper and lower extremities, +head lag

## 2025-05-10 NOTE — ASSESSMENT
[FreeTextEntry1] : RANJITH FERNANDEZ  is a 30.2 week gestation infant, now chronologic age 2.5 mos, corrected age 42 weeks seen in  follow-up. Pertinent NICU history includes 30 weeks s/p rds, aop, positional talipes, splayed sutures and anemia of prematurity.  The following issues were addressed at this visit.  Growth and nutrition: Weight gain has been  24g/day and plots at the 67th percentile for corrected age.  Head growth and length are at the 65 and 87th percentiles respectively.  Baby is currently feeding EHM and the plan is to continue. Given choking episodes, which mom associates with HMF, she stopped HMF about 2 weeks prior to this visit. Given adequate weight gain and good EHM supply, may continue to given unfortified EHM. Due to prematurity, solid foods are not recommended until 5-6 months corrected age with good head control. Continue vitamin supplements.  Development/neuro: baby has developmental delay for chronologic age, was seen by PT/OT today and given home exercises to do. Early Intervention is recommended and new referral was placed.  Baby will follow-up with pediatric developmental at 6 months - still needs appointment.  Anemia: Baby has been on iron supplements and will continue . Hct reviewed and is appropriate. May switch to Theo-vi-sol with iron (OTC).  Cardio: Normal ECHO 2025  OLGA: Baby has signs of  OLGA and plan to continue famotidine with close follow up from PMD. Reviewed non-pharmacologic methods to reduce symptoms including holding baby upright after feeding is completed for 20-30 minutes. Seen by ENT after choking episode, who recommended swallow study and f/u in 1-2 months. Swallow study showed feeding immaturity but no signs/symptoms of aspiration or penetration. Recommended feedings wiht Dr. Orona Harrison/Transition nipple. Also recommended feeding therapy through EI.  Neuro: Maternal concern for seizure-like activity (episodes of body shaking with tremors of hands and widening of eyes lasting ~15 seconds). Being followed by Neurology. Routine EEG normal. Plan to admit for VEEG. Neuro follow up 2025.  ROP: Baby is at risk for ROP and other ophthalmologic complications due to prematurity and will follow with ophthalmology in 6 months. Parents informed of importance of ophtho follow-up.   Other:   Health maintenance: Reviewed routine vaccination schedule with parent as well as guidance for flu vaccine for family, COVID-19 precautions, and need for PMD f/u.  Also discussed bathing and skin care recommendations.  Reviewed notes by (other services): NICU d/c summary, neuro, ENT  Next neonatology f/u: 25.

## 2025-05-10 NOTE — ASSESSMENT
[FreeTextEntry1] : RANJITH FERNANDEZ  is a 30.2 week gestation infant, now chronologic age 2.5 mos, corrected age 42 weeks seen in  follow-up. Pertinent NICU history includes 30 weeks s/p rds, aop, positional talipes, splayed sutures and anemia of prematurity.  The following issues were addressed at this visit.  Growth and nutrition: Weight gain has been  24g/day and plots at the 67th percentile for corrected age.  Head growth and length are at the 65 and 87th percentiles respectively.  Baby is currently feeding EHM and the plan is to continue. Given choking episodes, which mom associates with HMF, she stopped HMF about 2 weeks prior to this visit. Given adequate weight gain and good EHM supply, may continue to given unfortified EHM. Due to prematurity, solid foods are not recommended until 5-6 months corrected age with good head control. Continue vitamin supplements.  Development/neuro: baby has developmental delay for chronologic age, was seen by PT/OT today and given home exercises to do. Early Intervention is recommended and new referral was placed.  Baby will follow-up with pediatric developmental at 6 months - still needs appointment.  Anemia: Baby has been on iron supplements and will continue . Hct reviewed and is appropriate. May switch to Theo-vi-sol with iron (OTC).  Cardio: Normal ECHO 2025  OLGA: Baby has signs of  OLGA and plan to continue famotidine with close follow up from PMD. Reviewed non-pharmacologic methods to reduce symptoms including holding baby upright after feeding is completed for 20-30 minutes. Seen by ENT after choking episode, who recommended swallow study and f/u in 1-2 months. Swallow study showed feeding immaturity but no signs/symptoms of aspiration or penetration. Recommended feedings wiht Dr. Orona In[place]ster/Transition nipple. Also recommended feeding therapy through EI.  Neuro: Maternal concern for seizure-like activity (episodes of body shaking with tremors of hands and widening of eyes lasting ~15 seconds). Being followed by Neurology. Routine EEG normal. Plan to admit for VEEG. Neuro follow up 2025.  ROP: Baby is at risk for ROP and other ophthalmologic complications due to prematurity and will follow with ophthalmology in 6 months. Parents informed of importance of ophtho follow-up.   Other:   Health maintenance: Reviewed routine vaccination schedule with parent as well as guidance for flu vaccine for family, COVID-19 precautions, and need for PMD f/u.  Also discussed bathing and skin care recommendations.  Reviewed notes by (other services): NICU d/c summary, neuro, ENT  Next neonatology f/u: 25.

## 2025-05-10 NOTE — PHYSICAL EXAM
[Pink] : pink [Well Perfused] : well perfused [No Rashes] : no rashes [No Birth Marks] : no birth marks [Conjunctiva Clear] : conjunctiva clear [PERRL] : pupils were equal, round, reactive to light  [Ears Normal Position and Shape] : normal position and shape of ears [Nares Patent] : nares patent [No Nasal Flaring] : no nasal flaring [Moist and Pink Mucous Membranes] : moist and pink mucous membranes [Palate Intact] : palate intact [No Torticollis] : no torticollis [No Neck Masses] : no neck masses [Symmetric Expansion] : symmetric chest expansion [No Retractions] : no retractions [Clear to Auscultation] : lungs clear to auscultation  [Normal S1, S2] : normal S1 and S2 [Regular Rhythm] : regular rhythm [No Murmur] : no mumur [Normal Pulses] : normal pulses [Non Distended] : non distended [No HSM] : no hepatosplenomegaly appreciated [No Masses] : no masses were palpated [Normal Bowel Sounds] : normal bowel sounds [No Umbilical Hernia] : no umbilical hernia [Normal Genitalia] : normal genitalia [No Sacral Dimples] : no sacral dimples [No Scoliosis] : no scoliosis [Normal Range of Motion] : normal range of motion [Normal Posture] : normal posture [No evidence of Hip Dislocation] : no evidence of hip dislocation [Active and Alert] : active and alert [Normal muscle tone] : normal muscle tone of all extremites [Normal truncal tone] : normal truncal tone [Normal deep tendon reflexes] : normal deep tendon reflexes [No head lag] : no head lag [Symmetric Leonard] : the [place] reflex was ~L present [Palmar Grasp] : the palmar grasp reflex was ~L present [Plantar Grasp] : the plantar grasp reflex was ~L present [Strong Suck] : the strong sucking reflex was ~L present [Rooting] : the rooting reflex was ~L present [Placing/Stepping] : the placing/stepping reflex was present [Fixes On Faces] : fixes on faces [Turns Head Side to Side in Prone] : turns head side to side in prone [Follows to Midline] : the gaze does not follow to the midline [Follows Past Midline] : the gaze does not follow past the midline [Follows 180 Degrees] : visual track 180 degrees not achieved [Smiles Sociallly] : does not have a social smile [Laughs] : does not laugh [[place]] : does not  [Babbles] : does not babble [Lifts Head And Chest 30 degress in Prone] : does not lift the head and chest 30 degress in prone [Lifts Head And Chest 45 degress in Prone] : does not lift the head and chest 45 degress in prone [Weight Shifts in Prone] : does not weight shift in prone [Reaches For Objects in Prone] : does not reach for objects in prone [Rolls Front to Back] : does not roll front to back [Separates Hip Girdle From Trunk in Rolling] : does not separate hip girdle from trunk in rolling [Rolls Back to Front] : does not roll over from back to front [Brings Feet to Mouth] : does not bring feet to mouth [Gets to Quadruped] : does not get to quadruped [Maintains Quadruped] : does not maintain quadruped [Crawls] : does not crawl [Creeps] : does not creeps [Sits With Support] : does not sit with support [Tripod Sits with Support] : tripod sits without support [Sits With Support with Back Straight] : does not sit with support back straight [Gets to Knees] : does not get to knees [Pulls Stand] : does not pull self to a standing position [Cruises] : does not walk holding onto furniture [Hands Open] : the hands are not open [Reaches for Objects] : does not reach for objects [Transfers Objects] : does not transfer objects from hand to hand [Rakes Small Objects] : does not rake small objects [Mature Pincer Grasp] : does not have a mature pincer grasp [Swats at Objects] : does not swat at objects [Brings Hands to Mouth] : does not bring hands to mouth [Brings Hands to Midline] : does not bring hands to midline [Brings Objects to Mouth] : does not bring objects to mouth [de-identified] : left foot positional talipes [de-identified] : splayed sutures, +slip through, low tone in upper and lower extremities, +head lag

## 2025-05-10 NOTE — PATIENT INSTRUCTIONS
[Verbal patient instructions provided] : Verbal patient instructions provided. [FreeTextEntry1] : current weight: 8lbs 5oz length: 1 foot 9 inches head circumference 14inches follow up with neurology 6/6/25 ophthalmologist 6 months neurodevelopmental; follow up at 6 months [FreeTextEntry2] : tummy time education reinforced as well as exercises given by PT to do at home [FreeTextEntry3] : follow up new referral made [FreeTextEntry4] : continue breastmilk 4ounces every 3-4 hours [FreeTextEntry6] : n/a [FreeTextEntry5] : continue ferrinsol, poly-vi-sol, and famotidine  [FreeTextEntry7] : n/a [FreeTextEntry8] : follow schedule with pmd [de-identified] : aquaphor for dry skin [de-identified] : follow up with neurology 6/6/25 s/p eeg [de-identified] : video eeg per neurology to be done in hospital

## 2025-05-10 NOTE — PHYSICAL EXAM
[Pink] : pink [Well Perfused] : well perfused [No Rashes] : no rashes [No Birth Marks] : no birth marks [Conjunctiva Clear] : conjunctiva clear [PERRL] : pupils were equal, round, reactive to light  [Ears Normal Position and Shape] : normal position and shape of ears [Nares Patent] : nares patent [No Nasal Flaring] : no nasal flaring [Moist and Pink Mucous Membranes] : moist and pink mucous membranes [Palate Intact] : palate intact [No Torticollis] : no torticollis [No Neck Masses] : no neck masses [Symmetric Expansion] : symmetric chest expansion [No Retractions] : no retractions [Clear to Auscultation] : lungs clear to auscultation  [Normal S1, S2] : normal S1 and S2 [Regular Rhythm] : regular rhythm [No Murmur] : no mumur [Normal Pulses] : normal pulses [Non Distended] : non distended [No HSM] : no hepatosplenomegaly appreciated [No Masses] : no masses were palpated [Normal Bowel Sounds] : normal bowel sounds [No Umbilical Hernia] : no umbilical hernia [Normal Genitalia] : normal genitalia [No Sacral Dimples] : no sacral dimples [No Scoliosis] : no scoliosis [Normal Range of Motion] : normal range of motion [Normal Posture] : normal posture [No evidence of Hip Dislocation] : no evidence of hip dislocation [Active and Alert] : active and alert [Normal muscle tone] : normal muscle tone of all extremites [Normal truncal tone] : normal truncal tone [Normal deep tendon reflexes] : normal deep tendon reflexes [No head lag] : no head lag [Symmetric Leonard] : the [place] reflex was ~L present [Palmar Grasp] : the palmar grasp reflex was ~L present [Plantar Grasp] : the plantar grasp reflex was ~L present [Strong Suck] : the strong sucking reflex was ~L present [Rooting] : the rooting reflex was ~L present [Placing/Stepping] : the placing/stepping reflex was present [Fixes On Faces] : fixes on faces [Turns Head Side to Side in Prone] : turns head side to side in prone [Follows to Midline] : the gaze does not follow to the midline [Follows Past Midline] : the gaze does not follow past the midline [Follows 180 Degrees] : visual track 180 degrees not achieved [Smiles Sociallly] : does not have a social smile [Laughs] : does not laugh [[place]] : does not  [Babbles] : does not babble [Lifts Head And Chest 30 degress in Prone] : does not lift the head and chest 30 degress in prone [Lifts Head And Chest 45 degress in Prone] : does not lift the head and chest 45 degress in prone [Weight Shifts in Prone] : does not weight shift in prone [Reaches For Objects in Prone] : does not reach for objects in prone [Rolls Front to Back] : does not roll front to back [Separates Hip Girdle From Trunk in Rolling] : does not separate hip girdle from trunk in rolling [Rolls Back to Front] : does not roll over from back to front [Brings Feet to Mouth] : does not bring feet to mouth [Gets to Quadruped] : does not get to quadruped [Maintains Quadruped] : does not maintain quadruped [Crawls] : does not crawl [Creeps] : does not creeps [Sits With Support] : does not sit with support [Sits With Support with Back Straight] : does not sit with support back straight [Tripod Sits with Support] : tripod sits without support [Gets to Knees] : does not get to knees [Pulls Stand] : does not pull self to a standing position [Cruises] : does not walk holding onto furniture [Hands Open] : the hands are not open [Reaches for Objects] : does not reach for objects [Transfers Objects] : does not transfer objects from hand to hand [Rakes Small Objects] : does not rake small objects [Mature Pincer Grasp] : does not have a mature pincer grasp [Swats at Objects] : does not swat at objects [Brings Hands to Mouth] : does not bring hands to mouth [Brings Hands to Midline] : does not bring hands to midline [Brings Objects to Mouth] : does not bring objects to mouth [de-identified] : left foot positional talipes [de-identified] : splayed sutures, +slip through, low tone in upper and lower extremities, +head lag

## 2025-05-10 NOTE — DATA REVIEWED
[de-identified] : eeg negative; neuro requested in patient veeg per mothers report; will follow up with neuro in [place]

## 2025-05-10 NOTE — HISTORY OF PRESENT ILLNESS
[Gestational Age: ___] : Gestational Age: [unfilled] [Chronological Age: ___] : Chronological Age: [unfilled] [Date of D/C: ___] : Date of D/C: [unfilled] [Developmental Pediatrics: ___] : Developmental Pediatrics: [unfilled] [Ophthalmology: ___] : Ophthalmology: [unfilled] [Corrected Age: ___] : Corrected Age: [unfilled] [Weight Gain Since Last Visit (oz/days) ___] : weight gain since last visit: [unfilled] (oz/days)  [Bloody] : not bloody [Mucousy] : no mucous [de-identified] : d/c [facility] [de-identified] : need for developmental and high risk follow up [de-identified] : 4/20/25 BRUE: choking episode on easter with feeding, brought to er at [facility] [de-identified] : neuro; s/p EEG follow up 6/6/25 [de-identified] : [facility] [de-identified] : done [FreeTextEntry3] : attempted; does not latch well [de-identified] : seedy [de-identified] : supine in [facility] [de-identified] : n/a [de-identified] : n/a [de-identified] : n/a

## 2025-05-10 NOTE — ASSESSMENT
[FreeTextEntry1] : RANJITH FERNANDEZ  is a 30.2 week gestation infant, now chronologic age 2.5 mos, corrected age 42 weeks seen in  follow-up. Pertinent NICU history includes 30 weeks s/p rds, aop, positional talipes, splayed sutures and anemia of prematurity.  The following issues were addressed at this visit.  Growth and nutrition: Weight gain has been  24g/day and plots at the 67th percentile for corrected age.  Head growth and length are at the 65 and 87th percentiles respectively.  Baby is currently feeding EHM and the plan is to continue. Given choking episodes, which mom associates with HMF, she stopped HMF about 2 weeks prior to this visit. Given adequate weight gain and good EHM supply, may continue to given unfortified EHM. Due to prematurity, solid foods are not recommended until 5-6 months corrected age with good head control. Continue vitamin supplements.  Development/neuro: baby has developmental delay for chronologic age, was seen by PT/OT today and given home exercises to do. Early Intervention is recommended and new referral was placed.  Baby will follow-up with pediatric developmental at 6 months - still needs appointment.  Anemia: Baby has been on iron supplements and will continue . Hct reviewed and is appropriate. May switch to Theo-vi-sol with iron (OTC).  Cardio: Normal ECHO 2025  OLGA: Baby has signs of  OLGA and plan to continue famotidine with close follow up from PMD. Reviewed non-pharmacologic methods to reduce symptoms including holding baby upright after feeding is completed for 20-30 minutes. Seen by ENT after choking episode, who recommended swallow study and f/u in 1-2 months. Swallow study showed feeding immaturity but no signs/symptoms of aspiration or penetration. Recommended feedings wiht Dr. [place]/Transition nipple. Also recommended feeding therapy through EI.  Neuro: Maternal concern for seizure-like activity (episodes of body shaking with tremors of hands and widening of eyes lasting ~15 seconds). Being followed by Neurology. Routine EEG normal. Plan to admit for VEEG. Neuro follow up 2025.  ROP: Baby is at risk for ROP and other ophthalmologic complications due to prematurity and will follow with ophthalmology in 6 months. Parents informed of importance of ophtho follow-up.   Other:   Health maintenance: Reviewed routine vaccination schedule with parent as well as guidance for flu vaccine for family, COVID-19 precautions, and need for PMD f/u.  Also discussed bathing and skin care recommendations.  Reviewed notes by (other services): NICU d/c summary, neuro, ENT  Next neonatology f/u: 25.

## 2025-05-10 NOTE — DATA REVIEWED
[de-identified] : eeg negative; neuro requested in patient veeg per mothers report; will follow up with neuro in [place]

## 2025-05-16 NOTE — DISCUSSION/SUMMARY
[FreeTextEntry1] : Likely choking reflux episode Increase Pepcid to BID  Gas  But sleeps well and normal BMS  BMS reviewed Breast fed Nl BM pattern reviewed

## 2025-05-16 NOTE — HISTORY OF PRESENT ILLNESS
[de-identified] : hospital follow up  [FreeTextEntry6] : Reviewed Hosp records and HX  Gagging episode with boith arms and legs involved and eyes were fixed normally on mom frothing at mouth  EEG Normal No feedoing aversion Breast Milk MVI with iron  Still has feflux

## 2025-06-19 NOTE — PHYSICAL EXAM
[Alert] : alert [Acute Distress] : no acute distress [Normocephalic] : normocephalic [Flat Open Anterior Poplar] : flat open anterior fontanelle [Red Reflex] : red reflex bilateral [PERRL] : PERRL [Normally Placed Ears] : normally placed ears [Auricles Well Formed] : auricles well formed [Clear Tympanic membranes] : clear tympanic membranes [Light reflex present] : light reflex present [Bony landmarks visible] : bony landmarks visible [Discharge] : no discharge [Nares Patent] : nares patent [Palate Intact] : palate intact [Uvula Midline] : uvula midline [Palpable Masses] : no palpable masses [Symmetric Chest Rise] : symmetric chest rise [Clear to Auscultation Bilaterally] : clear to auscultation bilaterally [Regular Rate and Rhythm] : regular rate and rhythm [S1, S2 present] : S1, S2 present [Murmurs] : no murmurs [+2 Femoral Pulses] : (+) 2 femoral pulses [Soft] : soft [Tender] : nontender [Distended] : nondistended [Bowel Sounds] : bowel sounds present [Hepatomegaly] : no hepatomegaly [Splenomegaly] : no splenomegaly [Central Urethral Opening] : central urethral opening [Testicles Descended] : testicles descended bilaterally [Patent] : patent [Normally Placed] : normally placed [No Abnormal Lymph Nodes Palpated] : no abnormal lymph nodes palpated [Zheng-Ortolani] : negative Zheng-Ortolani [Allis Sign] : negative Allis sign [Spinal Dimple] : no spinal dimple [Tuft of Hair] : no tuft of hair [Startle Reflex] : startle reflex present [Plantar Grasp] : plantar grasp reflex present [Symmetric Leonard] : symmetric leonard [Rash or Lesions] : no rash/lesions

## 2025-06-19 NOTE — CONSULT LETTER
[Dear  ___] : Dear  [unfilled], [Consult Letter:] : I had the pleasure of evaluating your patient, [unfilled]. [Please see my note below.] : Please see my note below. [Consult Closing:] : Thank you very much for allowing me to participate in the care of this patient.  If you have any questions, please do not hesitate to contact me. [Sincerely,] : Sincerely, [FreeTextEntry3] : Cornel Maria MD PGY-4, Child Neurology [facility] [address], [address] [place] [address]  Dr. Suresh Tang Child Neurology Attending Physician [place] [address], [place] [id number] [place] [id number]

## 2025-06-19 NOTE — CONSULT LETTER
[Dear  ___] : Dear  [unfilled], [Consult Letter:] : I had the pleasure of evaluating your patient, [unfilled]. [Please see my note below.] : Please see my note below. [Consult Closing:] : Thank you very much for allowing me to participate in the care of this patient.  If you have any questions, please do not hesitate to contact me. [Sincerely,] : Sincerely, [FreeTextEntry3] : Cornel Maria MD PGY-4, Child Neurology [facility] [address], [address] [place] [address]  Dr. Suresh Tang Child Neurology Attending Physician Alia [address], [place] [phone] [place] [address]

## 2025-06-19 NOTE — PLAN
[FreeTextEntry1] : [ ] CBC, CMP, Keppra level, [facility] comprehensive epilepsy panel (verbal consent obtained, consent form emailed to parents to sign and return) [ ] Continue Keppra 70mg BID (28.6 mg/kg/day) [ ] Seizure safety reviewed [ ] RTC in 3-4 months

## 2025-06-19 NOTE — DISCUSSION/SUMMARY
[Normal Growth] : growth [Normal Development] : development  [No Elimination Concerns] : elimination [Continue Regimen] : feeding [No Skin Concerns] : skin [Normal Sleep Pattern] : sleep [None] : no medical problems [Anticipatory Guidance Given] : Anticipatory guidance addressed as per the history of present illness section [Age Approp Vaccines] : Age appropriate vaccines administered [No Medications] : ~He/She~ is not on any medications [Parent/Guardian] : Parent/Guardian [] : The components of the vaccine(s) to be administered today are listed in the plan of care. The disease(s) for which the vaccine(s) are intended to prevent and the risks have been discussed with the caretaker.  The risks are also included in the appropriate vaccination information statements which have been provided to the patient's caregiver.  The caregiver has given consent to vaccinate. [FreeTextEntry1] : [place] screening review and referral as appropriate.  Review of when and how to  start solids based on age, development, and current  intake formula or breast milk.  Exclusive Breast feeding to 6 months lowers the risk of infection. But introduction of solids before 6 months may lower the risk of allergy. Therefore, formula fed infants may benefit from introduction of solids between 4 and 6 months if developmentally ready. Infants are developmentally ready when they show signs of being ready to sit on their own and they have good head control. Feeding strategies that reduce risk of allergy reviewed.  Vitamin D for breast feeding infants  Tummy time when awake.  Put baby to sleep on back, in own crib with no loose or soft bedding. Help baby to develop sleep and feeding routines.  If formula is needed, recommend iron-fortified formulations, 2-4 oz every 2-3 hrs.  When in car, patient should be in rear-facing car seat in back seat.  Pacifier benefits reviewed  [email or web address] reviewed   Shortage Prevnar Await Neuro clearance for DTaP

## 2025-06-19 NOTE — HISTORY OF PRESENT ILLNESS
[FreeTextEntry1] : 4mo ex 30.2wk M w/ GERD, focal epilepsy presenting for follow up.  Interval Hx:  Recent vEEG with L temporal spikes, no seizures or pushbutton events captured. MRI brain obtained and normal. Started Keppra 70 mg BID, MOC reports good compliance, initially was fussier for the first few weeks but this has since resolved, no other adverse effects. Since discharge, MOC and FOC have not noticed any additional episodes. He is growing and developing well.   Current meds: Keppra 70mg BID (28.6 mg/kg/day)  Semiology: Mom has noted episodes of whole body shaking w/ associated fine tremor of bilateral hands and eyes widening since birth, currently happening approx. 5x per day lasting up to 15-20 seconds. Mom states they happen at any time of day, including sleep, and mom has not noticed any particular provoking factors (though they do often happen during feeds). Mom also states that sometimes episodes will include patient looking to one side (usually left) w/ associated head turning to the same side.   BHx: ex 30wk M w/ 2 month NICU stay for respiratory distress, feeding/growing. Episodes occurred during NICU period as well. Mom notes that they have become less frequent over time. 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  Hospital Course: Discharge Date 2025 Admission Date 2025 17:14  HOSPITAL COURSE (6/3- ) Patient arrived to the floor in stable condition. vEEG (6/3-) abnormal with Occasional left temporal spikes. No pushed button events or seizures recorded. MRI brain normal. Started on keppra 70 mg BID.

## 2025-06-19 NOTE — PHYSICAL EXAM
[Well-appearing] : well-appearing [Normocephalic] : normocephalic [Anterior fontanel- Open] : anterior fontanel- open [Anterior fontanel- Soft] : anterior fontanel- soft [Anterior fontanel- Flat] : anterior fontanel- flat [No dysmorphic facial features] : no dysmorphic facial features [No ocular abnormalities] : no ocular abnormalities [Soft] : soft [No abnormal neurocutaneous stigmata or skin lesions] : no abnormal neurocutaneous stigmata or skin lesions [Straight] : straight [No kyle or dimples] : no kyle or dimples [No deformities] : no deformities [Alert] : alert [Regards] : regards [Pupils reactive to light] : pupils reactive to light [Turns to light] : turns to light [Tracks face, light or objects with full extraocular movements] : tracks face, light or objects with full extraocular movements [No facial asymmetry or weakness] : no facial asymmetry or weakness [No nystagmus] : no nystagmus [Responds to voice/sounds] : responds to voice/sounds [Midline tongue] : midline tongue [No fasciculations] : no fasciculations [Normal axial and appendicular muscle tone with symmetric limb movements] : normal axial and appendicular muscle tone with symmetric limb movements [Normal bulk] : normal bulk [Good  bilaterally] : good  bilaterally [No abnormal involuntary movements] : no abnormal involuntary movements [2+ biceps] : 2+ biceps [Knee jerks] : knee jerks [Leonard] : Leonard [Grasp] : grasp [Responds to touch and tickle] : responds to touch and tickle [de-identified] :  38.5cm

## 2025-06-19 NOTE — QUALITY MEASURES
[Seizure frequency] : Seizure frequency: Yes [Etiology, seizure type, and epilepsy syndrome] : Etiology, seizure type, and epilepsy syndrome: Yes [Side effects of anti-seizure medications] : Side effects of anti-seizure medications: Yes [Safety and education around seizures] : Safety and education around seizures: Yes [Sudden unexpected death in epilepsy (SUDEP)] : Sudden unexpected death in epilepsy: Yes

## 2025-06-19 NOTE — ASSESSMENT
[FreeTextEntry1] : 4mo ex30.4wk M w/ recently diagnosed focal epilepsy on keppra monotherapy presenting for follow up. Neurologic exam today is nonfocal and intact. EEG with L temporal spikes, no seizures captured, no further episodes of left head/trunk deviation. MRI performed inpatient was unrevealing. Will send invitae comprehensive panel and check labs and levels today. RTC in 3-4 months.

## 2025-06-19 NOTE — PHYSICAL EXAM
[Well-appearing] : well-appearing [Normocephalic] : normocephalic [Anterior fontanel- Open] : anterior fontanel- open [Anterior fontanel- Soft] : anterior fontanel- soft [Anterior fontanel- Flat] : anterior fontanel- flat [No dysmorphic facial features] : no dysmorphic facial features [No ocular abnormalities] : no ocular abnormalities [Soft] : soft [No abnormal neurocutaneous stigmata or skin lesions] : no abnormal neurocutaneous stigmata or skin lesions [Straight] : straight [No kyle or dimples] : no kyle or dimples [No deformities] : no deformities [Alert] : alert [Regards] : regards [Pupils reactive to light] : pupils reactive to light [Turns to light] : turns to light [Tracks face, light or objects with full extraocular movements] : tracks face, light or objects with full extraocular movements [No facial asymmetry or weakness] : no facial asymmetry or weakness [No nystagmus] : no nystagmus [Responds to voice/sounds] : responds to voice/sounds [Midline tongue] : midline tongue [No fasciculations] : no fasciculations [Normal axial and appendicular muscle tone with symmetric limb movements] : normal axial and appendicular muscle tone with symmetric limb movements [Normal bulk] : normal bulk [Good  bilaterally] : good  bilaterally [No abnormal involuntary movements] : no abnormal involuntary movements [2+ biceps] : 2+ biceps [Knee jerks] : knee jerks [Leonard] : Leonard [Grasp] : grasp [Responds to touch and tickle] : responds to touch and tickle [de-identified] :  38.5cm

## 2025-07-03 NOTE — HISTORY OF PRESENT ILLNESS
[de-identified] : discuss vaccine  [FreeTextEntry6] : Rweviewed notes and last vaccines Discussed feedings and sleep. Safe sleep recvciewed No sz activity Clear for vaccines   Arching with feedings Often needs to be held to stay asleep. Vomiting at least twice daily Spitting up frequently  No significant coughing, wheezing, or choking.  Vomiting is non bilious.  No mucus or blood in BM No significant rashes  Not irritable

## 2025-07-03 NOTE — DISCUSSION/SUMMARY
[FreeTextEntry1] : Need to call neuro to set up follow up   Reviewed notes from neurology  Need to set up 6 mo appt here   Safe sleep reafirmed  To reduce reflux symptoms, follow reflux precautions. Keep the baby upright after eating for 30-60 minutes after a feeding, but not so upright that knees push into stomach.   Consider smaller more frequent feedings. And accommodate cluster feedings. Milk that has not been warmed does not have to be discarded in 1 hour. It lasts at least 3 hours, perhaps 4.   Avoid holding infants to "stay asleep" and consider that effort may be a counterproductive replacement for what could and should be a small or clustered feeding. Likewise, overuse of pacifiers can serve as a replacement for what could and should be a small or clustered feeding. Additionally, vigorous sucking of the pacifier can increase swallowed air, which in turn can worsen reflux.   Thickened feedings are generally not recommended for very young infants. In breast fed infants it increases the risk of infection. Some infants have difficulty digesting the starch and develop more colicky symptoms. Finally, it can be difficult to get the right sized nipple and as a result make the work of eating more difficult. As with pacifiers, vigorous sucking on bottles with cereal can lead to swallowed air, worsen reflux, and frustrate the baby.